# Patient Record
(demographics unavailable — no encounter records)

---

## 2024-12-19 NOTE — PLAN
[TextEntry] : fluzone covid 19  men b rto 1-2 mths for men b 2 and will do labs at that time adol issues discussed

## 2025-01-27 NOTE — PHYSICAL EXAM
[General Appearance - Well Developed] : interactive [General Appearance - Well-Appearing] : well appearing [General Appearance - In No Acute Distress] : in no acute distress [Sclera] : the conjunctiva were normal [Outer Ear] : the ears and nose were normal in appearance [Examination Of The Oral Cavity] : mucous membranes were moist and pink [Normal Appearance] : was normal in appearance [Neck Supple] : was supple [Enlarged Diffusely] : was not enlarged [Respiration, Rhythm And Depth] : normal respiratory rhythm and effort [Auscultation Breath Sounds / Voice Sounds] : clear bilateral breath sounds [Heart Rate And Rhythm] : heart rate and rhythm were normal [Heart Sounds] : normal S1 and S2 [Murmurs] : no murmurs [Bowel Sounds] : normal bowel sounds [Abdomen Soft] : soft [Abdomen Tenderness] : non-tender [Abdominal Distention] : nondistended [] : no hepato-splenomegaly [Musculoskeletal Exam: Normal Movement Of All Extremities] : normal movements of all extremities [Motor Tone] : muscle strength and tone were normal [No Visual Abnormalities] : no visible abnormailities [Abnormal Color] : normal color and pigmentation [Skin Lesions 1] : no skin lesions were observed [Skin Turgor Decreased] : normal skin turgor [Normal] : normal texture and mobility

## 2025-01-27 NOTE — HISTORY OF PRESENT ILLNESS
[Good] : Good [Prior Anesthesia] : No prior anesthesia [Anesthesia Reaction] : no anesthesia reaction [Clotting Disorder] : no clotting disorder [Bleeding Disorder] : no bleeding disorder [Sudden Death] : no sudden death [FreeTextEntry2] : 02/14/2025 [de-identified] : Dr. Ysabel Okeefe [FreeTextEntry1] : All:tuna Meds: None Fhx: no bleeding nor clotting d/o's, no problem with anesthesia Has been well, no fever, no cough scheduled for septoplasty with septal repair for deviated nasal septum and obstruction, has trouble sleeping at night